# Patient Record
Sex: FEMALE | Race: WHITE | Employment: FULL TIME | URBAN - METROPOLITAN AREA
[De-identification: names, ages, dates, MRNs, and addresses within clinical notes are randomized per-mention and may not be internally consistent; named-entity substitution may affect disease eponyms.]

---

## 2017-06-14 ENCOUNTER — OP HISTORICAL/CONVERTED ENCOUNTER (OUTPATIENT)
Dept: OTHER | Age: 57
End: 2017-06-14

## 2017-08-15 ENCOUNTER — OFFICE VISIT (OUTPATIENT)
Dept: NEUROLOGY | Age: 57
End: 2017-08-15

## 2017-08-15 VITALS
RESPIRATION RATE: 20 BRPM | DIASTOLIC BLOOD PRESSURE: 70 MMHG | BODY MASS INDEX: 35.94 KG/M2 | WEIGHT: 229 LBS | SYSTOLIC BLOOD PRESSURE: 122 MMHG | HEIGHT: 67 IN

## 2017-08-15 DIAGNOSIS — G44.84 EXERTIONAL HEADACHE: Primary | ICD-10-CM

## 2017-08-15 RX ORDER — INDOMETHACIN 50 MG/1
CAPSULE ORAL
Qty: 60 CAP | Refills: 3 | Status: SHIPPED | OUTPATIENT
Start: 2017-08-15

## 2017-08-15 RX ORDER — GABAPENTIN 300 MG/1
300 CAPSULE ORAL
COMMUNITY

## 2017-08-15 RX ORDER — HYDROXYZINE 25 MG/1
25 TABLET, FILM COATED ORAL 3 TIMES DAILY
Refills: 3 | COMMUNITY
Start: 2017-07-20

## 2017-08-15 NOTE — PROGRESS NOTES
New patient presenting with headache since falling in [facility]. Patient reported Hematoma and black eye at that time.

## 2017-08-15 NOTE — PROGRESS NOTES
[address] [facility]. [address]   [place] [address] [facility] [address] [facility], [facility] [address]    [facility]   [phone] Fax             Referring: Emi Miller MD      Chief Complaint   Patient presents with    Headache     new patient   [address] [facility] [address]     55year-old right-handed woman who presents today for evaluation of what she calls headache ever since she tripped on the floor at [facility] September 19, 2015. She tells me on September 19, 2015 she was walking in the back room at [facility] and was saying good morning to a coworker. There was a palate in the floor with a piece of cardboard attached. She was looking at her coworker again greeting him and did not see the piece of cardboard and apparently tripped on that. She fell to the floor striking the right front of her head. She had a right frontal hematoma there. She was taken to the hospital and evaluated. She subsequently since that time has had continuous headaches ever since. She has had multiple evaluations. She has been seen by multiple different specialties. She notes that she has a headache in the bioccipital regions noting that has some minor facial pain as well. She notes that her symptoms increase when she lifts heavy things or when she exerts herself. She has not been exerting herself secondary to this. She has been on light duty at work. She says that the pain feels like someone has hit her with a brick. She says that if she carries groceries into the house she will get this. She does not do any other strenuous activity secondary to the pain coming on. As soon as she stops doing anything strenuous the pain stops. She has no weakness. No visual loss or disturbance. She has not had any loss of consciousness. She has no other focal symptoms. No chest pain or palpitations with this. No prodrome. No nausea no vomiting. No associated symptoms.   She has had MRI of the brain this been normal.  She is an MRI of the cervical spine. She has been seen by orthopedics. She has been seen by pain management. She has had occipital nerve blocks. She notes that the first occipital nerve blocks seem to help perhaps a little bit for 2-3 weeks but then her symptoms return. She had another injection in her neck and it really made no difference. [facility] and she was tried on [employer] as well as Ultram and Valium. She was also seen by Dr. Jim Mcmahon a neurologist and given Neurontin and Elavil and Naprosyn and that was no help either. She was seen by [facility] where she had a course of physical therapy also dry needling. That did not help. She was seen by pain management where she was tried with Zanaflex as well as nerve blocks. She was never given Trileptal.  Never given Indocin. She never had any leakage of fluid from her nose or eyes. Does not get tearing of the eyes with this. She does not have unilateral type symptoms. Does not have multiple types of these headaches throughout the day that are very short-lived with tearing. Does not seem to happen in the face primarily. Again more in the back of the head although she says it can happen in other places as well. She does not have sharp shooting pain from the occiput that radiates forward. She does not have a sore spot in the back of her head.     Past Medical History:   Diagnosis Date    Anxiety     Anxiety     Arthritis     Depression     GERD (gastroesophageal reflux disease)     Headache     Hypertension     IBS (irritable bowel syndrome)     Memory loss     Muscle pain     MVP (mitral valve prolapse)     Nausea & vomiting     Other ill-defined conditions     alessio valve prolapse    Overweight     RLS (restless legs syndrome)     Skipped beats     Thromboembolus (HCC)     Thrombophlebitis    Thyroid disease     Hypothyroid    TMJ (dislocation of temporomandibular joint)     TMJ syndrome     Unspecified sleep apnea     uses cpap    Urinary incontinence, stress        Past Surgical History:   Procedure Laterality Date    HX GI      Rectal Fissure Repair    HX GYN      hysterectomy & bladder sling    HX HEENT      TMJ Procedure    HX ORTHOPAEDIC      Left bunionectomy       Current Outpatient Prescriptions   Medication Sig Dispense Refill    gabapentin (NEURONTIN) 300 mg capsule Take 300 mg by mouth. 2 caps per day      hydrOXYzine HCl (ATARAX) 25 mg tablet 25 mg three (3) times daily. 3    cholecalciferol, vitamin d3, (VITAMIN D) 1,000 unit tablet Take 1,000 Units by mouth daily.  CALCIUM CARBONATE/VITAMIN D3 (CALCIUM 600 + D PO) Take 2 Tabs by mouth daily.  citalopram (CELEXA) 10 mg tablet Take 10 mg by mouth daily.  levothyroxine (SYNTHROID) 75 mcg tablet Take 75 mcg by mouth daily (before breakfast).  dicyclomine (BENTYL) 20 mg tablet Take 20 mg by mouth as needed.  lisinopril (PRINIVIL, ZESTRIL) 20 mg tablet Take 20 mg by mouth daily. Allergies   Allergen Reactions    Bactrim [Sulfamethoprim] Unknown (comments)    Sulfa (Sulfonamide Antibiotics) Rash       Social History   Substance Use Topics    Smoking status: Former Smoker     Packs/day: 0.25     Years: 10.00     Quit date: 1995    Smokeless tobacco: Never Used    Alcohol use No      Comment: One mixed drink 4 times a year       Family History   Problem Relation Age of Onset    Other Mother      MVP    Hypertension Mother     Emphysema Father     Coronary Artery Disease Father     Kidney Disease Father     Other Father      PAD    Hypertension Father     Heart Disease Father     Diabetes Sister     Other Sister      1 sister  w/ DM, 2nd sister  w/ lupus complications    Asthma Brother        Review of Systems  Pertinent positives and negatives are as noted above. Remainder of comprehensive systems review is negative.     Examination  Visit Vitals    /70    Resp 20    Ht 5' 7\" (1.702 m)    Wt 103.9 kg (229 lb)    BMI 35.87 kg/m2     Pleasant, well appearing. No icterus. Oropharynx clear. Supple neck without bruit. Heart regular. No murmur. No edema. She has no tenderness over the occipital notches bilaterally. No significant paracervical spasm. No spasm about the trapezius muscles. No point tenderness about the cranium or temporal arteries. Neurologically, she is awake, alert, and oriented with normal speech and language. Her cognition is normal. Intact cranial nerves 2-12. No nystagmus. Visual fields full to confrontation. Disk margins are flat bilaterally. She has normal bulk and tone. She has no abnormal movement. She has no pronation or drift. She generates full strength in the upper and lower extremities to direct confrontational testing. Reflexes are symmetrical in the upper and lower extremities bilaterally. Her toes are down bilaterally. No Schrader. Finger nose finger and rapid alternating movements are normal.  Steady gait. No sensory deficit to primary modalities. Medical records are reviewed and scanned into the media section. These include evaluations as noted above. She has had MRI scan I reviewed the reports and the brain demonstrated no significant abnormality. Her MRI of the cervical spine just some degenerative changes. Nothing major. Trials of medications and occipital nerve blocks were outlined as she noted above in her history. She actually did quite a good job and relating the history as again outlined in these records. Impression/Plan  Nice lady status post head trauma was noted above with an extensive evaluation including MRIs of the brain and cervical spine as noted above and with a normal examination. I agree that some of the headache that she complains does seem to be more consistent with an occipital neuralgia however she is failed multiple medication trials and she is also failed occipital nerve blocks.   The interesting part of the headache syndrome of what she describes today is really more of an exertional headache and given that the headache syndrome is of that nature were going to go ahead and treated as such. We discussed this today at length. We discussed the manner in which we treat exertional headache. To that end we will use Indocin 50 mg twice daily and we will take that with food. I confirm that she has never had any issue with GI bleeding. Discussed the potential for GI upset and GI bleed. Cautioned her again to use this with food. We will can use again a very low-dose. Answered all her questions today. I had like to see how this does with her headache. Follow-up in 2 months. This note was created using voice recognition software. Despite editing, there may be syntax errors. This note will not be viewable in [address]

## 2017-08-15 NOTE — PATIENT INSTRUCTIONS
Information Regarding Testing     If you have physican order for a test or a medication denied by your insurance company, this does not mean the test or medication is not appropriate for you as that is a medical decision, not a decision to be made by an insurance company representative or by an [facility] physician who has not interviewed and examined you. This is a decision to be made between you and your physician. The denial of services is a contractual matter between you and your insurance company, not an issue between your physician and the insurance company. If your test or medication is denied, you can take the following steps to help resolve the issue:    1. File a complaint with the [facility] regarding your insurance company's denial of services ordered for you. You can do this either by calling them directly or by completing an on-line complaint form on the fake company 2[employer]. This can be found at [email or web address]    2. Also file a formal complaint with your insurance company and ask to have the name of the person denying the service so that you may explore a legal option should you be harmed by this denial of service. Again, the fact the insurance company will not pay for the service does not mean it is not medically necessary and I would encourage you to follow through with the plan that was made with your physician    3. File a written complaint with your employer so your employer and benefit manager is aware of the poor coverage they are providing their employees. If you have medicare/medicaid, complain to your representative in the House and to your Jennyfer Quinones.     [facility]   Statement to Patients  April 1, 2014      In an effort to ensure the large volume of patient prescription refills is processed in the most efficient and expeditious manner, we are asking our patients to assist us by calling your Pharmacy for all prescription refills, this will include also your  Mail Order Pharmacy. The pharmacy will contact our office electronically to continue the refill process. Please do not wait until the last minute to call your pharmacy. We need at least 48 hours (2days) to fill prescriptions. We also encourage you to call your pharmacy before going to  your prescription to make sure it is ready. With regard to controlled substance prescription refill requests (narcotic refills) that need to be picked up at our office, we ask your cooperation by providing us with at least 72 hours (3days) notice that you will need a refill. We will not refill narcotic prescription refill requests after 4:00pm on any weekday, Monday through Thursday, or after 2:00pm on Fridays, or on the weekends. We encourage everyone to explore another way of getting your prescription refill request processed using [employer], our patient web portal through our electronic medical record system. [employer] is an efficient and effective way to communicate your medication request directly to the office and  downloadable as an vanna on your smart phone . [employer] also features a review functionality that allows you to view your medication list as well as leave messages for your physician. Are you ready to get connected? If so please review the attatched instructions or speak to any of our staff to get you set up right away! Thank you so much for your cooperation. Should you have any questions please contact our Practice Administrator. The Physicians and Staff,  [facility]     If we have ordered testing for you, we do not call patients with results and we do not give test results over the phone. We schedule follow up appointments so that your results can be discussed in person and any questions you have regarding them may be addressed.   If something of concern is revealed on your test, we will call you for a sooner follow up appointment. Additionally, results may be found by using the My Chart feature and one of our patient service representatives at the  can give you instructions on how to access this feature of our electronic medical record system. Learning About Living Bobbi Beverly  What is a living will? A living will is a legal form you use to write down the kind of care you want at the end of your life. It is used by the health professionals who will treat you if you aren't able to decide for yourself. If you put your wishes in writing, your loved ones and others will know what kind of care you want. They won't need to guess. This can ease your mind and be helpful to others. A living will is not the same as an estate or property will. An estate will explains what you want to happen with your money and property after you die. Is a living will a legal document? A living will is a legal document. Each state has its own laws about living [place]. If you move to another state, make sure that your living will is legal in the state where you now live. Or you might use a universal form that has been approved by many states. This kind of form can sometimes be completed and stored online. Your electronic copy will then be available wherever you have a connection to the Internet. In most cases, doctors will respect your wishes even if you have a form from a different state. · You don't need an  to complete a living will. But legal advice can be helpful if your state's laws are unclear, your health history is complicated, or your family can't agree on what should be in your living will. · You can change your living will at any time. Some people find that their wishes about end-of-life care change as their health changes. · In addition to making a living will, think about completing a medical power of  form.  This form lets you name the person you want to make end-of-life treatment decisions for you (your \"health care agent\") if you're not able to. Many hospitals and nursing homes will give you the forms you need to complete a living will and a medical power of . · Your living will is used only if you can't make or communicate decisions for yourself anymore. If you become able to make decisions again, you can accept or refuse any treatment, no matter what you wrote in your living will. · Your state may offer an online registry. This is a place where you can store your living will online so the doctors and nurses who need to treat you can find it right away. What should you think about when creating a living will? Talk about your end-of-life wishes with your family members and your doctor. Let them know what you want. That way the people making decisions for you won't be surprised by your choices. Think about these questions as you make your living will:  · Do you know enough about life support methods that might be used? If not, talk to your doctor so you know what might be done if you can't breathe on your own, your heart stops, or you're unable to swallow. · What things would you still want to be able to do after you receive life-support methods? Would you want to be able to walk? To speak? To eat on your own? To live without the help of machines? · If you have a choice, where do you want to be cared for? In your home? At a hospital or nursing home? · Do you want certain Adventism practices performed if you become very ill? · If you have a choice at the end of your life, where would you prefer to die? At home? In a hospital or nursing home? Somewhere else? · Would you prefer to be buried or cremated? · Do you want your organs to be donated after you die? What should you do with your living will? · Make sure that your family members and your health care agent have copies of your living will. · Give your doctor a copy of your living will to keep in your medical record.  If you have more than one doctor, make sure that each one has a copy. · You may want to put a copy of your living will where it can be easily found. Where can you learn more? Go to http://ivelisse-jaymie.info/. Enter P000 in the search box to learn more about \"Learning About Living Anastasiya Parks. \"  Current as of: August 8, 2016  Content Version: 11.3  © 7251-2831 [employer]. Care instructions adapted under license by [employer] (which disclaims liability or warranty for this information). If you have questions about a medical condition or this instruction, always ask your healthcare professional. [address] any warranty or liability for your use of this information. Advance Directives: Care Instructions  Your Care Instructions  An advance directive is a legal way to state your wishes at the end of your life. It tells your family and your doctor what to do if you can no longer say what you want. There are two main types of advance directives. You can change them any time that your wishes change. · A living will tells your family and your doctor your wishes about life support and other treatment. · A durable power of  for health care lets you name a person to make treatment decisions for you when you can't speak for yourself. This person is called a health care agent. If you do not have an advance directive, decisions about your medical care may be made by a doctor or a  who doesn't know you. It may help to think of an advance directive as a gift to the people who care for you. If you have one, they won't have to make tough decisions by themselves. Follow-up care is a key part of your treatment and safety. Be sure to make and go to all appointments, and call your doctor if you are having problems. It's also a good idea to know your test results and keep a list of the medicines you take. How can you care for yourself at home?   · Discuss your wishes with your loved ones and your doctor. This way, there are no surprises. · Many states have a unique form. Or you might use a universal form that has been approved by many states. This kind of form can sometimes be completed and stored online. Your electronic copy will then be available wherever you have a connection to the Internet. In most cases, doctors will respect your wishes even if you have a form from a different state. · You don't need a  to do an advance directive. But you may want to get legal advice. · Think about these questions when you prepare an advance directive:  ¨ Who do you want to make decisions about your medical care if you are not able to? Many people choose a family member or close friend. ¨ Do you know enough about life support methods that might be used? If not, talk to your doctor so you understand. ¨ What are you most afraid of that might happen? You might be afraid of having pain, losing your independence, or being kept alive by machines. ¨ Where would you prefer to die? Choices include your home, a hospital, or a nursing home. ¨ Would you like to have information about hospice care to support you and your family? ¨ Do you want to donate organs when you die? ¨ Do you want certain Oriental orthodox practices performed before you die? If so, put your wishes in the advance directive. · Read your advance directive every year, and make changes as needed. When should you call for help? Be sure to contact your doctor if you have any questions. Where can you learn more? Go to http://ivelisse-jaymie.info/. Enter R264 in the search box to learn more about \"Advance Directives: Care Instructions. \"  Current as of: November 17, 2016  Content Version: 11.3  © 0679-1946 [employer]. Care instructions adapted under license by [employer] (which disclaims liability or warranty for this information).  If you have questions about PRESCRIPTION REFILL [address] [facility]   Statement to Patients  April 1, 2014      In an effort to ensure the large volume of patient prescription refills is processed in the most efficient and expeditious manner, we are asking our patients to assist us by calling your Pharmacy for all prescription refills, this will include also your  Mail Order Pharmacy. The pharmacy will contact our office electronically to continue the refill process. Please do not wait until the last minute to call your pharmacy. We need at least 48 hours (2days) to fill prescriptions. We also encourage you to call your pharmacy before going to  your prescription to make sure it is ready. With regard to controlled substance prescription refill requests (narcotic refills) that need to be picked up at our office, we ask your cooperation by providing us with at least 72 hours (3days) notice that you will need a refill. We will not refill narcotic prescription refill requests after 4:00pm on any weekday, Monday through Thursday, or after 2:00pm on Fridays, or on the weekends. We encourage everyone to explore another way of getting your prescription refill request processed using [employer], our patient web portal through our electronic medical record system. [employer] is an efficient and effective way to communicate your medication request directly to the office and  downloadable as an vanna on your smart phone . [employer] also features a review functionality that allows you to view your medication list as well as leave messages for your physician. Are you ready to get connected? If so please review the attatched instructions or speak to any of our staff to get you set up right away! Thank you so much for your cooperation. Should you have any questions please contact our Practice Administrator.     The Physicians and Staff,  [address] [place] [address] [facility]   a medical condition or this instruction, always ask your healthcare professional. [address] any warranty or liability for your use of this information.

## 2017-08-15 NOTE — MR AVS SNAPSHOT
Visit Information Date & Time Provider Department Dept. Phone Encounter #  
 8/15/2017  4:00 PM Arslan Polo, José [address] [facility] [phone] [id number] Follow-up Instructions Return in about 8 weeks (around 10/10/2017). Upcoming Health Maintenance Date Due Hepatitis C Screening 1960 DTaP/Tdap/Td series (1 - Tdap) 10/7/1982 PAP AKA CERVICAL CYTOLOGY 10/7/1982 BREAST CANCER SCRN MAMMOGRAM 10/7/2011 FOBT Q 1 YEAR AGE 50-75 10/7/2011 INFLUENZA AGE 9 TO ADULT 8/1/2017 Allergies as of 8/15/2017  Review Complete On: 8/15/2017 By: Arslan Polo MD  
  
 Severity Noted Reaction Type Reactions Bactrim [Sulfamethoprim]  08/15/2017    Unknown (comments) Sulfa (Sulfonamide Antibiotics)  08/19/2010    Rash Current Immunizations  Never Reviewed No immunizations on file. Not reviewed this visit Vitals BP Resp Height(growth percentile) Weight(growth percentile) BMI OB Status 122/70 20 5' 7\" (1.702 m) 229 lb (103.9 kg) 35.87 kg/m2 Hysterectomy Smoking Status Former Smoker Vitals History BMI and BSA Data Body Mass Index Body Surface Area  
 35.87 kg/m 2 2.22 m 2 Preferred Pharmacy Pharmacy Name Phone [id number] [place], [place] - [address] [facility] [phone] Your Updated Medication List  
  
   
This list is accurate as of: 8/15/17  5:13 PM.  Always use your most recent med list.  
  
  
  
  
 CALCIUM 600 + D PO Take 2 Tabs by mouth daily. citalopram 10 mg tablet Commonly known as:  Armin Covert Take 10 mg by mouth daily. dicyclomine 20 mg tablet Commonly known as:  BENTYL Take 20 mg by mouth as needed. gabapentin 300 mg capsule Commonly known as:  NEURONTIN Take 300 mg by mouth. 2 caps per day  
  
 hydrOXYzine HCl 25 mg tablet Commonly known as:  ATARAX 25 mg three (3) times daily. indomethacin 50 mg capsule Commonly known as:  INDOCIN  
1 po bid with food  
  
 levothyroxine 75 mcg tablet Commonly known as:  SYNTHROID Take 75 mcg by mouth daily (before breakfast). lisinopril 20 mg tablet Commonly known as:  Lollie Jump Take 20 mg by mouth daily. VITAMIN D3 1,000 unit tablet Generic drug:  cholecalciferol Take 1,000 Units by mouth daily. Prescriptions Sent to Pharmacy Refills  
 indomethacin (INDOCIN) 50 mg capsule 3 Si po bid with food Class: Normal  
 Pharmacy: [id number]- [address] #: [phone] Follow-up Instructions Return in about 8 weeks (around 10/10/2017). Patient Instructions Information Regarding Testing If you have physican order for a test or a medication denied by your insurance company, this does not mean the test or medication is not appropriate for you as that is a medical decision, not a decision to be made by an insurance company representative or by an [facility] physician who has not interviewed and examined you. This is a decision to be made between you and your physician. The denial of services is a contractual matter between you and your insurance company, not an issue between your physician and the insurance company. If your test or medication is denied, you can take the following steps to help resolve the issue: 1. File a complaint with the [facility] of Insurance regarding your insurance company's denial of services ordered for you. You can do this either by calling them directly or by completing an on-line complaint form on the [place]. This can be found at www.[place].gov 2.    Also file a formal complaint with your insurance company and ask to have the name of the person denying the service so that you may explore a legal option should you be harmed by this denial of service. Again, the fact the insurance company will not pay for the service does not mean it is not medically necessary and I would encourage you to follow through with the plan that was made with your physician 3. File a written complaint with your employer so your employer and benefit manager is aware of the poor coverage they are providing their employees. If you have medicare/medicaid, complain to your representative in the House and to your Jennyfer Quinones. PRESCRIPTION REFILL POLICY [facility] Statement to Patients April 1, 2014 In an effort to ensure the large volume of patient prescription refills is processed in the most efficient and expeditious manner, we are asking our patients to assist us by calling your Pharmacy for all prescription refills, this will include also your  Mail Order Pharmacy. The pharmacy will contact our office electronically to continue the refill process. Please do not wait until the last minute to call your pharmacy. We need at least 48 hours (2days) to fill prescriptions. We also encourage you to call your pharmacy before going to  your prescription to make sure it is ready. With regard to controlled substance prescription refill requests (narcotic refills) that need to be picked up at our office, we ask your cooperation by providing us with at least 72 hours (3days) notice that you will need a refill. We will not refill narcotic prescription refill requests after 4:00pm on any weekday, Monday through Thursday, or after 2:00pm on Fridays, or on the weekends. We encourage everyone to explore another way of getting your prescription refill request processed using [employer], our patient web portal through our electronic medical record system.  [employer] is an efficient and effective way to communicate your medication request directly to the office and  downloadable as an vanna on your smart phone . [employer] also features a review functionality that allows you to view your medication list as well as leave messages for your physician. Are you ready to get connected? If so please review the attatched instructions or speak to any of our staff to get you set up right away! Thank you so much for your cooperation. Should you have any questions please contact our Practice Administrator. The Physicians and Staff,  [facility] If we have ordered testing for you, we do not call patients with results and we do not give test results over the phone. We schedule follow up appointments so that your results can be discussed in person and any questions you have regarding them may be addressed. If something of concern is revealed on your test, we will call you for a sooner follow up appointment. Additionally, results may be found by using the My Chart feature and one of our patient service representatives at the  can give you instructions on how to access this feature of our electronic medical record system. Claire Baez 1721 What is a living will? A living will is a legal form you use to write down the kind of care you want at the end of your life. It is used by the health professionals who will treat you if you aren't able to decide for yourself. If you put your wishes in writing, your loved ones and others will know what kind of care you want. They won't need to guess. This can ease your mind and be helpful to others. A living will is not the same as an estate or property will. An estate will explains what you want to happen with your money and property after you die. Is a living will a legal document? A living will is a legal document. Each state has its own laws about living [place].  If you move to another state, make sure that your living will is legal in the state where you now live. Or you might use a universal form that has been approved by many states. This kind of form can sometimes be completed and stored online. Your electronic copy will then be available wherever you have a connection to the Internet. In most cases, doctors will respect your wishes even if you have a form from a different state. · You don't need an  to complete a living will. But legal advice can be helpful if your state's laws are unclear, your health history is complicated, or your family can't agree on what should be in your living will. · You can change your living will at any time. Some people find that their wishes about end-of-life care change as their health changes. · In addition to making a living will, think about completing a medical power of  form. This form lets you name the person you want to make end-of-life treatment decisions for you (your \"health care agent\") if you're not able to. Many hospitals and nursing homes will give you the forms you need to complete a living will and a medical power of . · Your living will is used only if you can't make or communicate decisions for yourself anymore. If you become able to make decisions again, you can accept or refuse any treatment, no matter what you wrote in your living will. · Your state may offer an online registry. This is a place where you can store your living will online so the doctors and nurses who need to treat you can find it right away. What should you think about when creating a living will? Talk about your end-of-life wishes with your family members and your doctor. Let them know what you want. That way the people making decisions for you won't be surprised by your choices. Think about these questions as you make your living will: · Do you know enough about life support methods that might be used?  If not, talk to your doctor so you know what might be done if you can't breathe on your own, your heart stops, or you're unable to swallow. · What things would you still want to be able to do after you receive life-support methods? Would you want to be able to walk? To speak? To eat on your own? To live without the help of machines? · If you have a choice, where do you want to be cared for? In your home? At a hospital or nursing home? · Do you want certain Anabaptist practices performed if you become very ill? · If you have a choice at the end of your life, where would you prefer to die? At home? In a hospital or nursing home? Somewhere else? · Would you prefer to be buried or cremated? · Do you want your organs to be donated after you die? What should you do with your living will? · Make sure that your family members and your health care agent have copies of your living will. · Give your doctor a copy of your living will to keep in your medical record. If you have more than one doctor, make sure that each one has a copy. · You may want to put a copy of your living will where it can be easily found. Where can you learn more? Go to http://ivelisse-jaymie.info/. Enter U333 in the search box to learn more about \"Learning About Living Perroy. \" Current as of: August 8, 2016 Content Version: 11.3 © 5117-7879 Touch [employer]ion[employer]. Care instructions adapted under license by [employer] (which disclaims liability or warranty for this information). If you have questions about a medical condition or this instruction, always ask your healthcare professional. [place] [address] any warranty or liability for your use of this information. Advance Directives: Care Instructions Your Care Instructions An advance directive is a legal way to state your wishes at the end of your life. It tells your family and your doctor what to do if you can no longer say what you want. There are two main types of advance directives. You can change them any time that your wishes change. · A living will tells your family and your doctor your wishes about life support and other treatment. · A durable power of  for health care lets you name a person to make treatment decisions for you when you can't speak for yourself. This person is called a health care agent. If you do not have an advance directive, decisions about your medical care may be made by a doctor or a  who doesn't know you. It may help to think of an advance directive as a gift to the people who care for you. If you have one, they won't have to make tough decisions by themselves. Follow-up care is a key part of your treatment and safety. Be sure to make and go to all appointments, and call your doctor if you are having problems. It's also a good idea to know your test results and keep a list of the medicines you take. How can you care for yourself at home? · Discuss your wishes with your loved ones and your doctor. This way, there are no surprises. · Many states have a unique form. Or you might use a universal form that has been approved by many states. This kind of form can sometimes be completed and stored online. Your electronic copy will then be available wherever you have a connection to the Internet. In most cases, doctors will respect your wishes even if you have a form from a different state. · You don't need a  to do an advance directive. But you may want to get legal advice. · Think about these questions when you prepare an advance directive: ¨ Who do you want to make decisions about your medical care if you are not able to? Many people choose a family member or close friend. ¨ Do you know enough about life support methods that might be used? If not, talk to your doctor so you understand. ¨ What are you most afraid of that might happen?  You might be afraid of having pain, losing your independence, or being kept alive by machines. ¨ Where would you prefer to die? Choices include your home, a hospital, or a nursing home. ¨ Would you like to have information about hospice care to support you and your family? ¨ Do you want to donate organs when you die? ¨ Do you want certain Christianity practices performed before you die? If so, put your wishes in the advance directive. · Read your advance directive every year, and make changes as needed. When should you call for help? Be sure to contact your doctor if you have any questions. Where can you learn more? Go to http://ivelisseProductivjaymie.info/. Enter R264 in the search box to learn more about \"Advance Directives: Care Instructions. \" Current as of: November 17, 2016 Content Version: 11.3 © 7510-7705 [employer]. Care instructions adapted under license by [employer] (which disclaims liability or warranty for this information). If you have questions about PRESCRIPTION REFILL POLICY [facility] Statement to Patients April 1, 2014 In an effort to ensure the large volume of patient prescription refills is processed in the most efficient and expeditious manner, we are asking our patients to assist us by calling your Pharmacy for all prescription refills, this will include also your  Mail Order Pharmacy. The pharmacy will contact our office electronically to continue the refill process. Please do not wait until the last minute to call your pharmacy. We need at least 48 hours (2days) to fill prescriptions. We also encourage you to call your pharmacy before going to  your prescription to make sure it is ready. With regard to controlled substance prescription refill requests (narcotic refills) that need to be picked up at our office, we ask your cooperation by providing us with at least 72 hours (3days) notice that you will need a refill. We will not refill narcotic prescription refill requests after 4:00pm on any weekday, Monday through Thursday, or after 2:00pm on Fridays, or on the weekends. We encourage everyone to explore another way of getting your prescription refill request processed using [employer], our patient web portal through our electronic medical record system. [employer] is an efficient and effective way to communicate your medication request directly to the office and  downloadable as an vanna on your smart phone . [employer] also features a review functionality that allows you to view your medication list as well as leave messages for your physician. Are you ready to get connected? If so please review the attatched instructions or speak to any of our staff to get you set up right away! Thank you so much for your cooperation. Should you have any questions please contact our Practice Administrator. The Physicians and Staff,  [facility]  
a medical condition or this instruction, always ask your healthcare professional. [place] [address] any warranty or liability for your use of this information. Introducing [facility]! [facility] introduces [employer] patient portal. Now you can access parts of your medical record, email your doctor's office, and request medication refills online. 1. In your internet browser, go to [email or web address] 2. Click on the First Time User? Click Here link in the Sign In box. You will see the New Member Sign Up page. 3. Enter your [employer] Access Code exactly as it appears below. You will not need to use this code after youve completed the sign-up process. If you do not sign up before the expiration date, you must request a new code. · [employer] Access Code: [email or web address] Expires: 10/23/2017  9:50 AM 
 
4.  Enter the last four digits of your Social Security Number (xxxx) and Date of Birth (mm/dd/yyyy) as indicated and click Submit. You will be taken to the next sign-up page. 5. Create a 2U ID. This will be your 2U login ID and cannot be changed, so think of one that is secure and easy to remember. 6. Create a 2U password. You can change your password at any time. 7. Enter your Password Reset Question and Answer. This can be used at a later time if you forget your password. 8. Enter your e-mail address. You will receive e-mail notification when new information is available in [address] 9. Click Sign Up. You can now view and download portions of your medical record. 10. Click the Download Summary menu link to download a portable copy of your medical information. If you have questions, please visit the Frequently Asked Questions section of the 2U website. Remember, 2U is NOT to be used for urgent needs. For medical emergencies, dial 911. Now available from your iPhone and Android! Please provide this summary of care documentation to your next provider. Your primary care clinician is listed as Rohan Minaya. If you have any questions after today's visit, please call [phone].

## 2017-10-06 ENCOUNTER — HOSPITAL ENCOUNTER (OUTPATIENT)
Dept: MRI IMAGING | Age: 57
Discharge: HOME | End: 2017-10-06
Attending: PHYSICIAN ASSISTANT
Payer: COMMERCIAL

## 2017-10-06 DIAGNOSIS — R51.9 OCCIPITAL PAIN: ICD-10-CM

## 2017-10-06 DIAGNOSIS — Z98.1 HISTORY OF FUSION OF CERVICAL SPINE: ICD-10-CM

## 2017-10-06 PROCEDURE — 72141 MRI NECK SPINE W/O DYE: CPT

## 2017-10-17 ENCOUNTER — OFFICE VISIT (OUTPATIENT)
Dept: NEUROLOGY | Age: 57
End: 2017-10-17

## 2017-10-17 VITALS
HEART RATE: 66 BPM | BODY MASS INDEX: 35.79 KG/M2 | RESPIRATION RATE: 17 BRPM | WEIGHT: 228 LBS | DIASTOLIC BLOOD PRESSURE: 82 MMHG | OXYGEN SATURATION: 97 % | TEMPERATURE: 98 F | HEIGHT: 67 IN | SYSTOLIC BLOOD PRESSURE: 118 MMHG

## 2017-10-17 DIAGNOSIS — G44.89 OTHER HEADACHE SYNDROME: Primary | ICD-10-CM

## 2017-10-17 RX ORDER — ESCITALOPRAM OXALATE 20 MG/1
20 TABLET ORAL DAILY
COMMUNITY

## 2017-10-17 NOTE — PROGRESS NOTES
Follow up for headaches. No significant changes in headaches since last visit. No acute problems reported.

## 2017-10-17 NOTE — PROGRESS NOTES
Date:  10/18/17     Name:  Franck Veliz  :  1960  MRN:  [id number]     PCP:  Germaine Hui MD    Chief Complaint   Patient presents with    Headache     follow up        HISTORY OF PRESENT ILLNESS:Follow up for head pain. Last visit she was placed on Indocin to see if she would get malissa head relief. She has seen orthopedics for second opinion and had a recent MRI scan which was performed in a month or facility and I reviewed the film today and there is nothing significant about that film today. She reports medication did not work. She her head pain is located on the back of her head. She describes it as a tightness. She report that it feels better on with  Ice. She report that the headache last  anywhere from a couple mIn to a couple of hours. Brenda Spray goes into the next day. She states after she rest it ease off. If she get up and do the same activity again like lifting it come right back. Denies any teary eye  She reports when the pain is real bad she will feel pain in her faces more like an ache. She denies any vision loss. Things that aggravate it is. Drive,head low, head up too high and reaching, lifting things. Turning head left and right when she is drive of she has to repeat it, Picking weeds,carrying thing while walking. She  states that it does not happen as often at home, because she is not doing as much work. However when she does do some of these activities she notices the pain is still present. Except as noted above, denies  fever, chills, cough. No CP or SOB. No dysuria, loss of bowel or bladder control. No Weight loss. Appetite good. Sleeping well. No sweats. No edema. No bruising or bleeding. No nausea or vomit. No diarrhea. No frequency, urgency, No depressive sxs. No anxiety. Denies sore throat, nasal congestion, nasal discharge, epistaxis, tinnitus, hearing loss, back pain, muscle pain, or joint pain.        Current Outpatient Prescriptions   Medication Sig    escitalopram oxalate (LEXAPRO) 20 mg tablet Take 20 mg by mouth daily.  gabapentin (NEURONTIN) 300 mg capsule Take 300 mg by mouth. 2 caps per day    indomethacin (INDOCIN) 50 mg capsule 1 po bid with food    cholecalciferol, vitamin d3, (VITAMIN D) 1,000 unit tablet Take 1,000 Units by mouth daily.  CALCIUM CARBONATE/VITAMIN D3 (CALCIUM 600 + D PO) Take 2 Tabs by mouth daily.  levothyroxine (SYNTHROID) 75 mcg tablet Take 75 mcg by mouth daily (before breakfast).  dicyclomine (BENTYL) 20 mg tablet Take 20 mg by mouth as needed.  lisinopril (PRINIVIL, ZESTRIL) 20 mg tablet Take 20 mg by mouth daily.  hydrOXYzine HCl (ATARAX) 25 mg tablet 25 mg three (3) times daily.  citalopram (CELEXA) 10 mg tablet Take 10 mg by mouth daily. No current facility-administered medications for this visit.       Allergies   Allergen Reactions    Bactrim [Sulfamethoprim] Unknown (comments)    Sulfa (Sulfonamide Antibiotics) Rash     Past Medical History:   Diagnosis Date    Anxiety     Anxiety     Arthritis     Depression     GERD (gastroesophageal reflux disease)     Headache     Hypertension     IBS (irritable bowel syndrome)     Memory loss     Muscle pain     MVP (mitral valve prolapse)     Nausea & vomiting     Other ill-defined conditions(799.89)     alessio valve prolapse    Overweight(278.02)     RLS (restless legs syndrome)     Skipped beats     Thromboembolus (HCC)     Thrombophlebitis    Thyroid disease     Hypothyroid    TMJ (dislocation of temporomandibular joint)     TMJ syndrome     Unspecified sleep apnea     uses cpap    Urinary incontinence, stress      Past Surgical History:   Procedure Laterality Date    HX GI      Rectal Fissure Repair    HX GYN      hysterectomy & bladder sling    HX HEENT      TMJ Procedure    HX ORTHOPAEDIC      Left bunionectomy     Social History     Social History    Marital status:      Spouse name: N/A    Number of children: N/A    Years of education: N/A     Occupational History    Not on file. Social History Main Topics    Smoking status: Former Smoker     Packs/day: 0.25     Years: 10.00     Quit date: 1995    Smokeless tobacco: Never Used    Alcohol use No      Comment: One mixed drink 4 times a year    Drug use: Yes     Special: Prescription, OTC    Sexual activity: No     Other Topics Concern    Not on file     Social History Narrative     Family History   Problem Relation Age of Onset    Other Mother      MVP    Hypertension Mother     Emphysema Father     Coronary Artery Disease Father     Kidney Disease Father     Other Father      PAD    Hypertension Father     Heart Disease Father     Diabetes Sister     Other Sister      1 sister  w/ DM, 2nd sister  w/ lupus complications    Asthma Brother        PHYSICAL EXAMINATION:    Visit Vitals    /82    Pulse 66    Temp 98 °F (36.7 °C)    Resp 17    Ht 5' 7\" (1.702 m)    Wt 103.4 kg (228 lb)    SpO2 97%    BMI 35.71 kg/m2   General: Well defined, nourished, and groomed individual in no acute distress. Neck: Supple, nontender, no bruits, no pain with resistance to active range of motion. Heart: Regular rate and rhythm, no murmurs, rub, or gallop. Normal S1S2. Lungs: Clear to auscultation bilaterally with equal chest expansion, no cough, no wheeze  Musculoskeletal: Extremities revealed no edema and had full range of motion of joints. Psych: Good mood and bright affect      NEUROLOGICAL EXAMINATION:   Mental Status: Alert and oriented to person, place, and time       Cranial Nerves:   II, III, IV, VI: Visual acuity grossly intact. Visual fields are normal.   Pupils are equal, round, and reactive to light and accommodation. Extra-ocular movements are full and fluid. Fundoscopic exam was benign, no ptosis or nystagmus. V-XII: Hearing is grossly intact. Facial features are symmetric, with normal sensation and strength.  The palate rises symmetrically and the tongue protrudes midline. Sternocleidomastoids 5/5.       Motor Examination: Normal tone, bulk, and strength, 5/5 muscle strength throughout.       Coordination: No resting or intention tremor      Gait and Station: Steady while walking. Normal arm swing. No pronator drift. No muscle wasting or fasiculations noted.       Reflexes: DTRs 2+ throughout. ASSESSMENT AND PLAN  Ongoing complaint of headache, variable as described. Given the fact she has not had any relief we discussed the fact that this just may be something she is going to have to live with. We will stop the Indocin. Follow-up as needed. Norberto Malin FNANNA-YONATAN    I have reviewed the documentation provided by the nurse practitioner, Ms. Tolu Rocha, and we have discussed her findings and the clinical impression. I have formulated with her the proposed management plans for this patient. Additionally,  I have personally evaluated the patient to verify the history and to confirm physical findings. Below are my additional comments:  Patient returns today for follow-up for complaints of headache status post an injury she sustained at work. Recall from my previous note she has tried multiple medications and failed. She has tried physical therapy as well as dry needling. She has seen orthopedics for second opinion and had a recent MRI scan which was performed in a month or facility and I reviewed the film today and there is nothing significant about that film today. She notes that she is not having a headache when she is at home. The Indocin she says did not do anything for the headache.   It is interesting in that she says that she only gets a headache when she has to do something but really on review again of the way she describes a headache she is describing a pressure sensation whenever she has to do any kind of activity and is really not consistent with exertional headache which is what I was going on previously trying to classify this headache into a syndrome. It seems as though as long as she is not having to go to work she is not having a headache. It also seems as though as long as she is not having to do any kind of activity she is not having any kind of pressure sensation. She is not having any sharp shooting pain. Not having any focal deficits. Not having any autonomic type symptoms. She did tolerate the Indocin but again says that it did not think. We discussed her case today. Her examination remains nonfocal.  Given the fact she has not had any relief we discussed the fact that this just may be something she is going to have to live with. We will stop the Indocin. Follow-up as needed.     Total time: 25 min   Counseling / coordination time: 15 min   > 50% counseling / coordination?: Yes re: as documented above, questions, etc    Carolina Posadas MD

## 2017-12-14 ENCOUNTER — OP HISTORICAL/CONVERTED ENCOUNTER (OUTPATIENT)
Dept: OTHER | Age: 57
End: 2017-12-14

## 2018-01-24 ENCOUNTER — OP HISTORICAL/CONVERTED ENCOUNTER (OUTPATIENT)
Dept: OTHER | Age: 58
End: 2018-01-24

## 2018-06-25 ENCOUNTER — OP HISTORICAL/CONVERTED ENCOUNTER (OUTPATIENT)
Dept: OTHER | Age: 58
End: 2018-06-25

## 2018-08-13 ENCOUNTER — OFFICE VISIT (OUTPATIENT)
Dept: NEUROLOGY | Age: 58
End: 2018-08-13

## 2018-08-13 DIAGNOSIS — G44.84 EXERTIONAL HEADACHE: ICD-10-CM

## 2018-08-13 DIAGNOSIS — R41.3 SHORT-TERM MEMORY LOSS: ICD-10-CM

## 2018-08-13 DIAGNOSIS — F32.A ANXIETY AND DEPRESSION: ICD-10-CM

## 2018-08-13 DIAGNOSIS — R47.89 WORD FINDING DIFFICULTY: ICD-10-CM

## 2018-08-13 DIAGNOSIS — R41.89 DIFFICULTY PROCESSING INFORMATION: ICD-10-CM

## 2018-08-13 DIAGNOSIS — Z87.820 HISTORY OF CONCUSSION: ICD-10-CM

## 2018-08-13 DIAGNOSIS — F41.9 ANXIETY AND DEPRESSION: ICD-10-CM

## 2018-08-13 DIAGNOSIS — R41.9 DEFICIT IN COMPREHENSION: ICD-10-CM

## 2018-08-13 DIAGNOSIS — F07.81 POST CONCUSSION SYNDROME: Primary | ICD-10-CM
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
## 2018-10-01 ENCOUNTER — OFFICE VISIT (OUTPATIENT)
Dept: NEUROLOGY | Age: 58
End: 2018-10-01

## 2018-10-01 DIAGNOSIS — Z87.820 HISTORY OF CONCUSSION: ICD-10-CM

## 2018-10-01 DIAGNOSIS — R47.89 WORD FINDING DIFFICULTY: ICD-10-CM

## 2018-10-01 DIAGNOSIS — G44.89 OTHER HEADACHE SYNDROME: ICD-10-CM

## 2018-10-01 DIAGNOSIS — F07.81 POST CONCUSSION SYNDROME: Primary | ICD-10-CM

## 2018-10-01 DIAGNOSIS — F41.9 MILD ANXIETY: ICD-10-CM

## 2018-10-01 DIAGNOSIS — F32.1 MODERATE MAJOR DEPRESSION (HCC): ICD-10-CM

## 2018-10-01 DIAGNOSIS — F90.0 ATTENTION DEFICIT HYPERACTIVITY DISORDER (ADHD), INATTENTIVE TYPE, MODERATE: Chronic | ICD-10-CM

## 2018-10-01 DIAGNOSIS — R45.851 PASSIVE SUICIDAL IDEATIONS: ICD-10-CM
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
## 2020-04-20 ENCOUNTER — ED HISTORICAL/CONVERTED ENCOUNTER (OUTPATIENT)
Dept: OTHER | Age: 60
End: 2020-04-20

## 2020-07-13 ENCOUNTER — OP HISTORICAL/CONVERTED ENCOUNTER (OUTPATIENT)
Dept: OTHER | Age: 60
End: 2020-07-13

## 2020-08-18 ENCOUNTER — ED HISTORICAL/CONVERTED ENCOUNTER (OUTPATIENT)
Dept: OTHER | Age: 60
End: 2020-08-18

## 2021-04-06 ENCOUNTER — APPOINTMENT (OUTPATIENT)
Dept: CT IMAGING | Age: 61
End: 2021-04-06
Attending: FAMILY MEDICINE
Payer: COMMERCIAL

## 2021-04-06 ENCOUNTER — HOSPITAL ENCOUNTER (EMERGENCY)
Age: 61
Discharge: HOME | End: 2021-04-06
Attending: FAMILY MEDICINE
Payer: COMMERCIAL

## 2021-04-06 ENCOUNTER — APPOINTMENT (OUTPATIENT)
Dept: GENERAL RADIOLOGY | Age: 61
End: 2021-04-06
Attending: FAMILY MEDICINE
Payer: COMMERCIAL

## 2021-04-06 VITALS
WEIGHT: 225 LBS | TEMPERATURE: 98.4 F | SYSTOLIC BLOOD PRESSURE: 151 MMHG | HEART RATE: 81 BPM | BODY MASS INDEX: 35.31 KG/M2 | RESPIRATION RATE: 20 BRPM | DIASTOLIC BLOOD PRESSURE: 79 MMHG | OXYGEN SATURATION: 99 % | HEIGHT: 67 IN

## 2021-04-06 DIAGNOSIS — K44.9 HIATAL HERNIA: ICD-10-CM

## 2021-04-06 DIAGNOSIS — E87.6 HYPOKALEMIA: Primary | ICD-10-CM

## 2021-04-06 LAB
ALBUMIN SERPL-MCNC: 4.1 G/DL (ref 3.5–5)
ALBUMIN/GLOB SERPL: 1.3 {RATIO} (ref 1.1–2.2)
ALP SERPL-CCNC: 104 U/L (ref 45–117)
ALT SERPL-CCNC: 33 U/L (ref 12–78)
ANION GAP SERPL CALC-SCNC: 17 MMOL/L (ref 5–15)
AST SERPL W P-5'-P-CCNC: 27 U/L (ref 15–37)
BASOPHILS # BLD: 0 K/UL (ref 0–0.1)
BASOPHILS NFR BLD: 0 % (ref 0–1)
BILIRUB SERPL-MCNC: 1 MG/DL (ref 0.2–1)
BUN SERPL-MCNC: 18 MG/DL (ref 6–20)
BUN/CREAT SERPL: 18 (ref 12–20)
CA-I BLD-MCNC: 9.3 MG/DL (ref 8.5–10.1)
CHLORIDE SERPL-SCNC: 99 MMOL/L (ref 97–108)
CO2 SERPL-SCNC: 21 MMOL/L (ref 21–32)
CREAT SERPL-MCNC: 1 MG/DL (ref 0.55–1.02)
DIFFERENTIAL METHOD BLD: ABNORMAL
EOSINOPHIL # BLD: 0.1 K/UL (ref 0–0.4)
EOSINOPHIL NFR BLD: 1 % (ref 0–7)
ERYTHROCYTE [DISTWIDTH] IN BLOOD BY AUTOMATED COUNT: 12.9 % (ref 11.5–14.5)
GLOBULIN SER CALC-MCNC: 3.2 G/DL (ref 2–4)
GLUCOSE BLD STRIP.AUTO-MCNC: 142 MG/DL (ref 65–100)
GLUCOSE SERPL-MCNC: 138 MG/DL (ref 65–100)
HCT VFR BLD AUTO: 34.5 % (ref 35–47)
HGB BLD-MCNC: 12.3 G/DL (ref 11.5–16)
IMM GRANULOCYTES # BLD AUTO: 0 K/UL (ref 0–0.04)
IMM GRANULOCYTES NFR BLD AUTO: 0 % (ref 0–0.5)
LYMPHOCYTES # BLD: 1.6 K/UL (ref 0.8–3.5)
LYMPHOCYTES NFR BLD: 17 % (ref 12–49)
MCH RBC QN AUTO: 32.3 PG (ref 26–34)
MCHC RBC AUTO-ENTMCNC: 35.7 G/DL (ref 30–36.5)
MCV RBC AUTO: 90.6 FL (ref 80–99)
MONOCYTES # BLD: 0.5 K/UL (ref 0–1)
MONOCYTES NFR BLD: 5 % (ref 5–13)
NEUTS SEG # BLD: 7.5 K/UL (ref 1.8–8)
NEUTS SEG NFR BLD: 77 % (ref 32–75)
PERFORMED BY, TECHID: ABNORMAL
PLATELET # BLD AUTO: 370 K/UL (ref 150–400)
PMV BLD AUTO: 10.4 FL (ref 8.9–12.9)
POTASSIUM SERPL-SCNC: 2.5 MMOL/L (ref 3.5–5.1)
PROT SERPL-MCNC: 7.3 G/DL (ref 6.4–8.2)
RBC # BLD AUTO: 3.81 M/UL (ref 3.8–5.2)
SODIUM SERPL-SCNC: 137 MMOL/L (ref 136–145)
TROPONIN I SERPL-MCNC: <0.05 NG/ML
WBC # BLD AUTO: 9.8 K/UL (ref 3.6–11)

## 2021-04-06 PROCEDURE — 74011000636 HC RX REV CODE- 636: Performed by: FAMILY MEDICINE

## 2021-04-06 PROCEDURE — 71275 CT ANGIOGRAPHY CHEST: CPT

## 2021-04-06 PROCEDURE — 99284 EMERGENCY DEPT VISIT MOD MDM: CPT

## 2021-04-06 PROCEDURE — 80053 COMPREHEN METABOLIC PANEL: CPT

## 2021-04-06 PROCEDURE — 74011250637 HC RX REV CODE- 250/637: Performed by: FAMILY MEDICINE

## 2021-04-06 PROCEDURE — 84484 ASSAY OF TROPONIN QUANT: CPT

## 2021-04-06 PROCEDURE — 96374 THER/PROPH/DIAG INJ IV PUSH: CPT

## 2021-04-06 PROCEDURE — 85025 COMPLETE CBC W/AUTO DIFF WBC: CPT

## 2021-04-06 PROCEDURE — 74011250636 HC RX REV CODE- 250/636: Performed by: FAMILY MEDICINE

## 2021-04-06 PROCEDURE — 82962 GLUCOSE BLOOD TEST: CPT

## 2021-04-06 PROCEDURE — 96361 HYDRATE IV INFUSION ADD-ON: CPT

## 2021-04-06 PROCEDURE — 74011250637 HC RX REV CODE- 250/637: Performed by: EMERGENCY MEDICINE

## 2021-04-06 PROCEDURE — 96375 TX/PRO/DX INJ NEW DRUG ADDON: CPT

## 2021-04-06 PROCEDURE — 93005 ELECTROCARDIOGRAM TRACING: CPT

## 2021-04-06 PROCEDURE — 36415 COLL VENOUS BLD VENIPUNCTURE: CPT

## 2021-04-06 PROCEDURE — 71045 X-RAY EXAM CHEST 1 VIEW: CPT

## 2021-04-06 RX ORDER — MAGNESIUM SULFATE HEPTAHYDRATE 40 MG/ML
2 INJECTION, SOLUTION INTRAVENOUS ONCE
Status: DISCONTINUED | OUTPATIENT
Start: 2021-04-06 | End: 2021-04-06

## 2021-04-06 RX ORDER — POTASSIUM CHLORIDE 750 MG/1
40 TABLET, FILM COATED, EXTENDED RELEASE ORAL
Status: COMPLETED | OUTPATIENT
Start: 2021-04-06 | End: 2021-04-06

## 2021-04-06 RX ORDER — GABAPENTIN 100 MG/1
100 CAPSULE ORAL ONCE
Status: COMPLETED | OUTPATIENT
Start: 2021-04-06 | End: 2021-04-06

## 2021-04-06 RX ORDER — POTASSIUM CHLORIDE AND SODIUM CHLORIDE 900; 300 MG/100ML; MG/100ML
INJECTION, SOLUTION INTRAVENOUS ONCE
Status: COMPLETED | OUTPATIENT
Start: 2021-04-06 | End: 2021-04-06

## 2021-04-06 RX ORDER — MAGNESIUM SULFATE HEPTAHYDRATE 40 MG/ML
2 INJECTION, SOLUTION INTRAVENOUS ONCE
Status: COMPLETED | OUTPATIENT
Start: 2021-04-06 | End: 2021-04-06

## 2021-04-06 RX ADMIN — POTASSIUM CHLORIDE AND SODIUM CHLORIDE: 900; 300 INJECTION, SOLUTION INTRAVENOUS at 16:05

## 2021-04-06 RX ADMIN — GABAPENTIN 100 MG: 100 CAPSULE ORAL at 19:34

## 2021-04-06 RX ADMIN — POTASSIUM CHLORIDE 40 MEQ: 750 TABLET, EXTENDED RELEASE ORAL at 16:04

## 2021-04-06 RX ADMIN — MAGNESIUM SULFATE HEPTAHYDRATE 2 G: 40 INJECTION, SOLUTION INTRAVENOUS at 19:21

## 2021-04-06 RX ADMIN — SODIUM CHLORIDE 1000 ML: 9 INJECTION, SOLUTION INTRAVENOUS at 15:40

## 2021-04-06 RX ADMIN — IOPAMIDOL 100 ML: 755 INJECTION, SOLUTION INTRAVENOUS at 17:15

## 2021-04-06 NOTE — DISCHARGE INSTRUCTIONS
Thank you! Thank you for allowing me to care for you in the emergency department. I sincerely hope that you are satisfied with your visit today. It is my goal to provide you with excellent care. Below you will find a list of your labs and imaging from your visit today. Should you have any questions regarding these results please do not hesitate to call the emergency department. Labs -     Recent Results (from the past 12 hour(s))   GLUCOSE, POC    Collection Time: 04/06/21  2:56 PM   Result Value Ref Range    Glucose (POC) 142 (H) 65 - 100 mg/dL    Performed by [facility]    CBC WITH AUTOMATED DIFF    Collection Time: 04/06/21  3:15 PM   Result Value Ref Range    WBC 9.8 3.6 - 11.0 K/uL    RBC 3.81 3.80 - 5.20 M/uL    HGB 12.3 11.5 - 16.0 g/dL    HCT 34.5 (L) 35.0 - 47.0 %    MCV 90.6 80.0 - 99.0 FL    MCH 32.3 26.0 - 34.0 PG    MCHC 35.7 30.0 - 36.5 g/dL    RDW 12.9 11.5 - 14.5 %    PLATELET 983 685 - 516 K/uL    MPV 10.4 8.9 - 12.9 FL    NEUTROPHILS 77 (H) 32 - 75 %    LYMPHOCYTES 17 12 - 49 %    MONOCYTES 5 5 - 13 %    EOSINOPHILS 1 0 - 7 %    BASOPHILS 0 0 - 1 %    IMMATURE GRANULOCYTES 0 0.0 - 0.5 %    ABS. NEUTROPHILS 7.5 1.8 - 8.0 K/UL    ABS. LYMPHOCYTES 1.6 0.8 - 3.5 K/UL    ABS. MONOCYTES 0.5 0.0 - 1.0 K/UL    ABS. EOSINOPHILS 0.1 0.0 - 0.4 K/UL    ABS. BASOPHILS 0.0 0.0 - 0.1 K/UL    ABS. IMM.  GRANS. 0.0 0.00 - 0.04 K/UL    DF AUTOMATED     METABOLIC PANEL, COMPREHENSIVE    Collection Time: 04/06/21  3:15 PM   Result Value Ref Range    Sodium 137 136 - 145 mmol/L    Potassium 2.5 (LL) 3.5 - 5.1 mmol/L    Chloride 99 97 - 108 mmol/L    CO2 21 21 - 32 mmol/L    Anion gap 17 (H) 5 - 15 mmol/L    Glucose 138 (H) 65 - 100 mg/dL    BUN 18 6 - 20 mg/dL    Creatinine 1.00 0.55 - 1.02 mg/dL    BUN/Creatinine ratio 18 12 - 20      GFR est AA >60 >60 ml/min/1.73m2    GFR est non-AA 57 (L) >60 ml/min/1.73m2    Calcium 9.3 8.5 - 10.1 mg/dL    Bilirubin, total 1.0 0.2 - 1.0 mg/dL    AST (SGOT) 27 15 - 37 U/L    ALT (SGPT) 33 12 - 78 U/L    Alk. phosphatase 104 45 - 117 U/L    Protein, total 7.3 6.4 - 8.2 g/dL    Albumin 4.1 3.5 - 5.0 g/dL    Globulin 3.2 2.0 - 4.0 g/dL    A-G Ratio 1.3 1.1 - 2.2     TROPONIN I    Collection Time: 04/06/21  3:15 PM   Result Value Ref Range    Troponin-I, Qt. <0.05 <0.05 ng/mL       Radiologic Studies -   CTA CHEST W OR W WO CONT   Final Result   Cardiomegaly. No evidence of pulmonary embolus or other acute   cardiopulmonary disease. Moderate size retrocardiac hiatal hernia. Please see full report. XR CHEST PORT   Final Result   No acute cardiopulmonary disease identified. No change in prior neck   fusion surgery. Small retrocardiac hiatal hernia. CT Results  (Last 48 hours)                 04/06/21 1730  CTA CHEST W OR W WO CONT Final result    Impression:  Cardiomegaly. No evidence of pulmonary embolus or other acute   cardiopulmonary disease. Moderate size retrocardiac hiatal hernia. Please see full report. Narrative:  History is shortness of breath. Tachycardia. Comparison is with a chest x-ray of earlier today. TECHNIQUE: Serial axial images were obtained from the thoracic inlet through the   lung bases at 2.5 mm intervals during 100 cc Isovue-370 IV contrast   administration. Axial and coronal maximal intensity projection/MIP and sagittal   and coronal reformatted images are reviewed. Lung, soft tissue and bone windows   are evaluated. Dose reduction: All CT scans at this facility are performed using dose reduction   optimization techniques as appropriate to a performed exam including the   following: Automated exposure control, adjustments of the mA and/or kV according   to patient size, or use of iterative reconstruction technique. Findings: The enhancement of the pulmonary arteries is exuberant. There is no   evidence of pulmonary embolus in the visualized pulmonary arteries.   There is no   pneumonia, pneumothorax or effusion. No parenchymal mass is identified. There is a moderate retrocardiac hiatal hernia. The heart is enlarged. There is no pericardial effusion. There is no adenopathy. The aorta is not dilated and there is no dissection. Images into the upper abdomen demonstrate nonobstructing bilateral renal   calculi. There is no evidence of hydronephrosis bilaterally. No gallstones are   seen. The osseous structures are within normal limits. CXR Results  (Last 48 hours)                 04/06/21 1600  XR CHEST PORT Final result    Impression:  No acute cardiopulmonary disease identified. No change in prior neck   fusion surgery. Small retrocardiac hiatal hernia. Narrative:  History is shortness of breath. Comparison is with the chest x-ray of 4/20/2020. An AP portable upright view of the chest demonstrates no pneumonia, pneumothorax   or effusion. The heart is not enlarged. The osseous structures are within normal   limits, except for a cervical plate and screws fusing the lower cervical spine. There is a small retrocardiac hiatal hernia. If you feel that you have not received excellent quality care or timely care, please ask to speak to the nurse manager. Please choose us in the future for your continued health care needs. ------------------------------------------------------------------------------------------------------------  The exam and treatment you received in the Emergency Department were for an urgent problem and are not intended as complete care. It is important that you follow-up with a doctor, nurse practitioner, or physician assistant to:  (1) confirm your diagnosis,  (2) re-evaluation of changes in your illness and treatment, and  (3) for ongoing care. If your symptoms become worse or you do not improve as expected and you are unable to reach your usual health care provider, you should return to the Emergency Department. We are available 24 hours a day. Please take your discharge instructions with you when you go to your follow-up appointment. If you have any problem arranging a follow-up appointment, contact the Emergency Department immediately. If a prescription has been provided, please have it filled as soon as possible to prevent a delay in treatment. Read the entire medication instruction sheet provided to you by the pharmacy. If you have any questions or reservations about taking the medication due to side effects or interactions with other medications, please call your primary care physician or contact the ER to speak with the charge nurse. Make an appointment with your family doctor or the physician you were referred to for follow-up of this visit as instructed on your discharge paperwork, as this is a mandatory follow-up. Return to the ER if you are unable to be seen or if you are unable to be seen in a timely manner. If you have any problem arranging the follow-up visit, contact the Emergency Department immediately.

## 2021-04-06 NOTE — ED TRIAGE NOTES
\" I got up and got in the shower and a vein popped in my right leg and I lost a lot of blood, then I went to work and started feeling really weak and short of breath \"

## 2021-04-06 NOTE — ED PROVIDER NOTES
EMERGENCY DEPARTMENT HISTORY AND PHYSICAL EXAM      Date: 4/6/2021  Patient Name: Alysha Flower    History of Presenting Illness     Chief Complaint   Patient presents with    Shortness of Breath    Varicose Veins    Fatigue       History Provided By:     HPI: Alysha Flower, is an extremely pleasant 62 y.o. female presenting to the ED with a chief complaint of shortness of breath and fatigue. She states this morning she had a small varicose vein that ruptured. She states she got the bleeding under control shortly after. She went to work and developed shortness of breath. She has never experienced this before. She denies any chest pains. She denies pleuritic chest pains. She denies any swelling of her calf. She has not been coughing. She denies any fevers, chills or rigors. No nausea, vomiting or diarrhea. She states she feels anxious but denies any other concerns at this time. There are no other complaints, changes, or physical findings at this time. PCP: Samantha Rutherford MD    No current facility-administered medications on file prior to encounter. Current Outpatient Medications on File Prior to Encounter   Medication Sig Dispense Refill    escitalopram oxalate (LEXAPRO) 20 mg tablet Take 20 mg by mouth daily.  gabapentin (NEURONTIN) 300 mg capsule Take 300 mg by mouth. 2 caps per day      hydrOXYzine HCl (ATARAX) 25 mg tablet 25 mg three (3) times daily. 3    indomethacin (INDOCIN) 50 mg capsule 1 po bid with food 60 Cap 3    cholecalciferol, vitamin d3, (VITAMIN D) 1,000 unit tablet Take 1,000 Units by mouth daily.  CALCIUM CARBONATE/VITAMIN D3 (CALCIUM 600 + D PO) Take 2 Tabs by mouth daily.  citalopram (CELEXA) 10 mg tablet Take 10 mg by mouth daily.  levothyroxine (SYNTHROID) 75 mcg tablet Take 75 mcg by mouth daily (before breakfast).  dicyclomine (BENTYL) 20 mg tablet Take 20 mg by mouth as needed.       lisinopril (PRINIVIL, ZESTRIL) 20 mg tablet Take 20 mg by mouth daily. Past History     Past Medical History:  Past Medical History:   Diagnosis Date    Anxiety     Anxiety     Arthritis     Depression     GERD (gastroesophageal reflux disease)     Headache     Hypertension     IBS (irritable bowel syndrome)     Memory loss     Muscle pain     MVP (mitral valve prolapse)     Nausea & vomiting     Other ill-defined conditions(799.89)     alessio valve prolapse    Overweight(278.02)     RLS (restless legs syndrome)     Skipped beats     Thromboembolus (HCC)     Thrombophlebitis    Thyroid disease     Hypothyroid    TMJ (dislocation of temporomandibular joint)     TMJ syndrome     Unspecified sleep apnea     uses cpap    Urinary incontinence, stress        Past Surgical History:  Past Surgical History:   Procedure Laterality Date    HX GI      Rectal Fissure Repair    HX GYN      hysterectomy & bladder sling    HX HEENT      TMJ Procedure    HX ORTHOPAEDIC      Left bunionectomy       Family History:  Family History   Problem Relation Age of Onset    Other Mother         MVP    Hypertension Mother     Emphysema Father     Coronary Artery Disease Father     Kidney Disease Father     Other Father         PAD    Hypertension Father     Heart Disease Father     Diabetes Sister     Other Sister         1 sister  w/ DM, 2nd sister  w/ lupus complications    Asthma Brother        Social History:  Social History     Tobacco Use    Smoking status: Former Smoker     Packs/day: 0.25     Years: 10.00     Pack years: 2.50     Quit date: 1995     Years since quittin.2    Smokeless tobacco: Never Used   Substance Use Topics    Alcohol use: No     Comment: One mixed drink 4 times a year    Drug use: Yes     Types: Prescription, OTC       Allergies:   Allergies   Allergen Reactions    Amlodipine Swelling    Bactrim [Sulfamethoprim] Unknown (comments)    Lisinopril Swelling    Macrobid [Nitrofurantoin Monohyd/M-Cryst] Swelling    Sulfa (Sulfonamide Antibiotics) Rash    Topiramate Swelling         Review of Systems     Review of Systems   Constitutional: Positive for fatigue. Negative for activity change, appetite change, chills and fever. HENT: Negative for congestion and sore throat. Eyes: Negative for photophobia and visual disturbance. Respiratory: Positive for shortness of breath. Negative for cough and wheezing. Cardiovascular: Negative for chest pain, palpitations and leg swelling. Gastrointestinal: Negative for abdominal pain, diarrhea, nausea and vomiting. Endocrine: Negative for cold intolerance and heat intolerance. Musculoskeletal: Negative for gait problem and joint swelling. Skin: Negative for color change and rash. Broken varicose vein. Neurological: Negative for dizziness and headaches. Physical Exam     Physical Exam  Constitutional:       Appearance: She is well-developed. HENT:      Head: Normocephalic and atraumatic. Mouth/Throat:      Mouth: Mucous membranes are moist.      Pharynx: Oropharynx is clear. Eyes:      Conjunctiva/sclera: Conjunctivae normal.      Pupils: Pupils are equal, round, and reactive to light. Neck:      Musculoskeletal: Normal range of motion and neck supple. Cardiovascular:      Rate and Rhythm: Normal rate and regular rhythm. Heart sounds: No murmur. Pulmonary:      Effort: Tachypnea present. No accessory muscle usage or respiratory distress. Breath sounds: No stridor. No wheezing, rhonchi or rales. Abdominal:      General: There is no distension. Tenderness: There is no abdominal tenderness. There is no rebound. Skin:     General: Skin is warm and dry. Comments: Very small area of bruising where her varicose vein broke. No current bleeding. Neurological:      General: No focal deficit present. Mental Status: She is alert and oriented to person, place, and time.    Psychiatric:         Mood and Affect: Mood normal.         Behavior: Behavior normal.         Lab and Diagnostic Study Results     Labs -     Recent Results (from the past 12 hour(s))   GLUCOSE, POC    Collection Time: 04/06/21  2:56 PM   Result Value Ref Range    Glucose (POC) 142 (H) 65 - 100 mg/dL    Performed by Wyatt Patiño    CBC WITH AUTOMATED DIFF    Collection Time: 04/06/21  3:15 PM   Result Value Ref Range    WBC 9.8 3.6 - 11.0 K/uL    RBC 3.81 3.80 - 5.20 M/uL    HGB 12.3 11.5 - 16.0 g/dL    HCT 34.5 (L) 35.0 - 47.0 %    MCV 90.6 80.0 - 99.0 FL    MCH 32.3 26.0 - 34.0 PG    MCHC 35.7 30.0 - 36.5 g/dL    RDW 12.9 11.5 - 14.5 %    PLATELET 094 984 - 655 K/uL    MPV 10.4 8.9 - 12.9 FL    NEUTROPHILS 77 (H) 32 - 75 %    LYMPHOCYTES 17 12 - 49 %    MONOCYTES 5 5 - 13 %    EOSINOPHILS 1 0 - 7 %    BASOPHILS 0 0 - 1 %    IMMATURE GRANULOCYTES 0 0.0 - 0.5 %    ABS. NEUTROPHILS 7.5 1.8 - 8.0 K/UL    ABS. LYMPHOCYTES 1.6 0.8 - 3.5 K/UL    ABS. MONOCYTES 0.5 0.0 - 1.0 K/UL    ABS. EOSINOPHILS 0.1 0.0 - 0.4 K/UL    ABS. BASOPHILS 0.0 0.0 - 0.1 K/UL    ABS. IMM. GRANS. 0.0 0.00 - 0.04 K/UL    DF AUTOMATED     METABOLIC PANEL, COMPREHENSIVE    Collection Time: 04/06/21  3:15 PM   Result Value Ref Range    Sodium 137 136 - 145 mmol/L    Potassium 2.5 (LL) 3.5 - 5.1 mmol/L    Chloride 99 97 - 108 mmol/L    CO2 21 21 - 32 mmol/L    Anion gap 17 (H) 5 - 15 mmol/L    Glucose 138 (H) 65 - 100 mg/dL    BUN 18 6 - 20 mg/dL    Creatinine 1.00 0.55 - 1.02 mg/dL    BUN/Creatinine ratio 18 12 - 20      GFR est AA >60 >60 ml/min/1.73m2    GFR est non-AA 57 (L) >60 ml/min/1.73m2    Calcium 9.3 8.5 - 10.1 mg/dL    Bilirubin, total 1.0 0.2 - 1.0 mg/dL    AST (SGOT) 27 15 - 37 U/L    ALT (SGPT) 33 12 - 78 U/L    Alk.  phosphatase 104 45 - 117 U/L    Protein, total 7.3 6.4 - 8.2 g/dL    Albumin 4.1 3.5 - 5.0 g/dL    Globulin 3.2 2.0 - 4.0 g/dL    A-G Ratio 1.3 1.1 - 2.2     TROPONIN I    Collection Time: 04/06/21  3:15 PM   Result Value Ref Range    Troponin-I, Qt. <0.05 <0.05 ng/mL       Radiologic Studies -   @lastxrresult@  CT Results  (Last 48 hours)               04/06/21 1730  CTA CHEST W OR W WO CONT Final result    Impression:  Cardiomegaly. No evidence of pulmonary embolus or other acute   cardiopulmonary disease. Moderate size retrocardiac hiatal hernia. Please see full report. Narrative:  History is shortness of breath. Tachycardia. Comparison is with a chest x-ray of earlier today. TECHNIQUE: Serial axial images were obtained from the thoracic inlet through the   lung bases at 2.5 mm intervals during 100 cc Isovue-370 IV contrast   administration. Axial and coronal maximal intensity projection/MIP and sagittal   and coronal reformatted images are reviewed. Lung, soft tissue and bone windows   are evaluated. Dose reduction: All CT scans at this facility are performed using dose reduction   optimization techniques as appropriate to a performed exam including the   following: Automated exposure control, adjustments of the mA and/or kV according   to patient size, or use of iterative reconstruction technique. Findings: The enhancement of the pulmonary arteries is exuberant. There is no   evidence of pulmonary embolus in the visualized pulmonary arteries. There is no   pneumonia, pneumothorax or effusion. No parenchymal mass is identified. There is a moderate retrocardiac hiatal hernia. The heart is enlarged. There is no pericardial effusion. There is no adenopathy. The aorta is not dilated and there is no dissection. Images into the upper abdomen demonstrate nonobstructing bilateral renal   calculi. There is no evidence of hydronephrosis bilaterally. No gallstones are   seen. The osseous structures are within normal limits. CXR Results  (Last 48 hours)               04/06/21 1600  XR CHEST PORT Final result    Impression:  No acute cardiopulmonary disease identified.  No change in prior neck   fusion surgery. Small retrocardiac hiatal hernia. Narrative:  History is shortness of breath. Comparison is with the chest x-ray of 4/20/2020. An AP portable upright view of the chest demonstrates no pneumonia, pneumothorax   or effusion. The heart is not enlarged. The osseous structures are within normal   limits, except for a cervical plate and screws fusing the lower cervical spine. There is a small retrocardiac hiatal hernia. Medical Decision Making   - I am the first provider for this patient. - I reviewed the vital signs, available nursing notes, past medical history, past surgical history, family history and social history. - Initial assessment performed. The patients presenting problems have been discussed, and they are in agreement with the care plan formulated and outlined with them. I have encouraged them to ask questions as they arise throughout their visit. Vital Signs-Reviewed the patient's vital signs. Patient Vitals for the past 12 hrs:   Temp Pulse Resp BP SpO2   04/06/21 1725  89 22 (!) 142/92 100 %   04/06/21 1504 98.4 °F (36.9 °C) 100 18 (!) 136/98 99 %         ED Course/ Provider Notes (Medical Decision Making):     Patient presented to the emergency department with a chief complaint of fatigue and shortness of breath. EKG shows sinus tachycardia with a heart rate of 100. No STEMI. Troponin is less than 0.05. Chest x-ray demonstrates no acute intrathoracic process other than small hiatal hernia. .  CBC unremarkable, BMP notable for hypokalemia of 2.5. She was subsequently given 40 meq of potassium chloride orally given as well as IV potassium replacement. Her tachycardia resolved with fluids. CT PE protocol was ordered and showed Cardiomegaly. No evidence of pulmonary embolus or other acute cardiopulmonary disease. Moderate size retrocardiac hiatal hernia. At the time of this dictation, her intravenous potassium chloride is completing infusion.   The case was discussed in detail with Dr. Savi Harmon who be assuming care of this patient until her infusion is completed and she is discharged. Mariam Silva was given a thorough list of signs and symptoms that would warrant an immediate return to the emergency department. Otherwise Mariam Silva will follow up with PCP. Procedures   Medical Decision Makingedical Decision Making  Performed by: Marissa Landon DO  Procedures  None       Disposition   Disposition:     Home     All of the diagnostic tests were reviewed and questions answered. Diagnosis, care plan and treatment options were discussed. The patient understands the instructions and will follow up as directed. The patients results have been reviewed with them. They have been counseled regarding their diagnosis. The patient verbally convey understanding and agreement of the signs, symptoms, diagnosis, treatment and prognosis and additionally agrees to follow up as recommended with their PCP in 24 - 48 hours. They also agree with the care-plan and convey that all of their questions have been answered. I have also put together some discharge instructions for them that include: 1) educational information regarding their diagnosis, 2) how to care for their diagnosis at home, as well a 3) list of reasons why they would want to return to the ED prior to their follow-up appointment, should their condition change. DISCHARGE PLAN:    1. Current Discharge Medication List      CONTINUE these medications which have NOT CHANGED    Details   escitalopram oxalate (LEXAPRO) 20 mg tablet Take 20 mg by mouth daily. gabapentin (NEURONTIN) 300 mg capsule Take 300 mg by mouth. 2 caps per day      hydrOXYzine HCl (ATARAX) 25 mg tablet 25 mg three (3) times daily.   Refills: 3      indomethacin (INDOCIN) 50 mg capsule 1 po bid with food  Qty: 60 Cap, Refills: 3      cholecalciferol, vitamin d3, (VITAMIN D) 1,000 unit tablet Take 1,000 Units by mouth daily.      CALCIUM CARBONATE/VITAMIN D3 (CALCIUM 600 + D PO) Take 2 Tabs by mouth daily. citalopram (CELEXA) 10 mg tablet Take 10 mg by mouth daily. levothyroxine (SYNTHROID) 75 mcg tablet Take 75 mcg by mouth daily (before breakfast). dicyclomine (BENTYL) 20 mg tablet Take 20 mg by mouth as needed. lisinopril (PRINIVIL, ZESTRIL) 20 mg tablet Take 20 mg by mouth daily. 2.   Follow-up Information    None           3. Return to ED if worse       4. Current Discharge Medication List            Diagnosis     Clinical Impression:    1. Hypokalemia    2. Hiatal hernia        Attestations:    En Duran, DO    Please note that this dictation was completed with [employer], the computer voice recognition software. Quite often unanticipated grammatical, syntax, homophones, and other interpretive errors are inadvertently transcribed by the computer software. Please disregard these errors. Please excuse any errors that have escaped final proofreading. Thank you.

## 2021-04-07 NOTE — ED NOTES
Pt noticeably uncomfortable and restless, states she has hx RLS and is past time to take her home dose of gabapentin. Discussed with Dr. Josemanuel Tenorio, verbal orders received to give gabapentin 100mg x1dose.

## 2022-03-18 PROBLEM — G44.84 EXERTIONAL HEADACHE: Status: ACTIVE | Noted: 2017-08-15

## 2023-05-23 RX ORDER — DICYCLOMINE HCL 20 MG
20 TABLET ORAL PRN
COMMUNITY

## 2023-05-23 RX ORDER — INDOMETHACIN 50 MG/1
CAPSULE ORAL
COMMUNITY
Start: 2017-08-15

## 2023-05-23 RX ORDER — HYDROXYZINE HYDROCHLORIDE 25 MG/1
25 TABLET, FILM COATED ORAL 3 TIMES DAILY
COMMUNITY
Start: 2017-07-20

## 2023-05-23 RX ORDER — ESCITALOPRAM OXALATE 20 MG/1
20 TABLET ORAL DAILY
COMMUNITY

## 2023-05-23 RX ORDER — LISINOPRIL 20 MG/1
20 TABLET ORAL DAILY
COMMUNITY

## 2023-05-23 RX ORDER — CITALOPRAM 10 MG/1
10 TABLET ORAL DAILY
COMMUNITY

## 2023-05-23 RX ORDER — GABAPENTIN 300 MG/1
300 CAPSULE ORAL
COMMUNITY

## 2023-05-23 RX ORDER — LEVOTHYROXINE SODIUM 0.07 MG/1
75 TABLET ORAL
COMMUNITY

## 2024-09-11 ENCOUNTER — TELEPHONE (OUTPATIENT)
Age: 64
End: 2024-09-11

## 2024-12-18 ENCOUNTER — PROCEDURE VISIT (OUTPATIENT)
Age: 64
End: 2024-12-18

## 2024-12-18 ENCOUNTER — OFFICE VISIT (OUTPATIENT)
Age: 64
End: 2024-12-18
Payer: COMMERCIAL

## 2024-12-18 VITALS
BODY MASS INDEX: 18.68 KG/M2 | HEART RATE: 87 BPM | OXYGEN SATURATION: 97 % | HEIGHT: 67 IN | WEIGHT: 119 LBS | SYSTOLIC BLOOD PRESSURE: 118 MMHG | DIASTOLIC BLOOD PRESSURE: 78 MMHG | RESPIRATION RATE: 18 BRPM

## 2024-12-18 DIAGNOSIS — R26.89 IMPAIRMENT OF BALANCE: ICD-10-CM

## 2024-12-18 DIAGNOSIS — R42 DIZZINESS AND GIDDINESS: Primary | ICD-10-CM

## 2024-12-18 DIAGNOSIS — G43.109 OCULAR MIGRAINE: ICD-10-CM

## 2024-12-18 DIAGNOSIS — H93.19 TINNITUS, UNSPECIFIED LATERALITY: ICD-10-CM

## 2024-12-18 DIAGNOSIS — R42 DIZZINESS: Primary | ICD-10-CM

## 2024-12-18 PROCEDURE — 99204 OFFICE O/P NEW MOD 45 MIN: CPT | Performed by: OTOLARYNGOLOGY

## 2024-12-18 ASSESSMENT — ENCOUNTER SYMPTOMS
EYE DISCHARGE: 0
SORE THROAT: 0
CHOKING: 0
VOMITING: 0
BACK PAIN: 0
ABDOMINAL PAIN: 0
SINUS PAIN: 0
STRIDOR: 0
NAUSEA: 0
SHORTNESS OF BREATH: 0
VOICE CHANGE: 0
WHEEZING: 0
SINUS PRESSURE: 0
EYE ITCHING: 0
PHOTOPHOBIA: 0
COUGH: 0
APNEA: 0
TROUBLE SWALLOWING: 0

## 2024-12-18 NOTE — PATIENT INSTRUCTIONS
out of walking paths.  Use nonskid floor wax, and wipe up spills right away, especially on ceramic tile floors.  If you use a walker or cane, put rubber tips on it. If you use crutches, clean the bottoms of them regularly with an abrasive pad, such as steel wool.  Keep your house well lit, especially stairways, porches, and outside walkways. Use night-lights in areas such as hallways and bathrooms. Add extra light switches or use remote switches (such as switches that go on or off when you clap your hands) to make it easier to turn lights on if you have to get up during the night.  Install sturdy handrails on stairways.  Move items in your cabinets so that the things you use a lot are on the lower shelves (about waist level).  Keep a cordless phone and a flashlight with new batteries by your bed. If possible, put a phone in each of the main rooms of your house, or carry a cell phone in case you fall and cannot reach a phone. Or, you can wear a device around your neck or wrist. You push a button that sends a signal for help.  Wear low-heeled shoes that fit well and give your feet good support. Use footwear with nonskid soles. Check the heels and soles of your shoes for wear. Repair or replace worn heels or soles.  Do not wear socks without shoes on wood or other flat, smooth floors.  Walk on the grass when the sidewalks are slippery. If you live in an area that gets snow and ice in the winter, sprinkle salt on slippery steps and sidewalks.    Preventing falls in the bath  Install grab bars and nonskid mats inside and outside your shower or tub and near the toilet and sinks.  Use shower chairs and bath benches.  Use a hand-held shower head that will allow you to sit while showering.  Get into a tub or shower by putting the weaker leg in first. Get out of a tub or shower with your strong side first.  Repair loose toilet seats and consider installing a raised toilet seat to make getting on and off the toilet

## 2024-12-18 NOTE — PROGRESS NOTES
Sanjuanita Ennis   1960, 64 y.o. female   [id number]       Referring Provider: Melvin Snider MD  Referral Type: In an order in Epic    Reason for Visit: Evaluation of the cause of disorders of hearing, tinnitus, or balance.    ADULT AUDIOLOGIC EVALUATION      Sanjuanita Ennis is a 64 y.o. female seen today, 12/18/2024 , for an initial audiologic evaluation.  Patient was seen by Melvin Snider MD following today's evaluation.    AUDIOLOGIC AND OTHER PERTINENT MEDICAL HISTORY:      Sanjuanita Ennis reports dizziness, described as a spinning-sensation, on-and-off for the past 6 months. Duration of episodes is approximately one minute. Frequency of episodes is weekly-monthly.Initial onset reportedly had no trigger. She reports intermittent, non-bothersome tinnitus of unknown laterality.     She denied otalgia, aural fullness, otorrhea, and history of noise exposure    Date: 12/18/2024     IMPRESSIONS:      Today's results revealed Normal hearing 250-8000 Hz bilaterally. Excellent speech understanding when in quiet. Tympanometry indicates normal middle ear function. Discussed test results and implications with patient. Discussed use of tinnitus management strategies. Discussed noise exposure and the importance of appropriate hearing protection when in hazardous noise environments.    Follow medical recommendations of Melvin Snider MD.     ASSESSMENT AND FINDINGS:     Otoscopy unremarkable.    RIGHT EAR:  Hearing Sensitivity: Normal hearing sensitivity 250-8000 Hz  Speech Recognition Threshold: 10 dB HL  Word Recognition: Excellent 100%, based on NU-6 by-difficulty list at 50 dBHL using recorded speech stimuli.    Tympanometry: Normal peak pressure and compliance, Type A tympanogram, consistent with normal middle ear function.       LEFT EAR:  Hearing Sensitivity: Normal hearing sensitivity 250-8000 Hz  Speech Recognition Threshold: 15 dB HL  Word Recognition: Excellent 100%, based on NU-6 by-difficulty list at 50 dBHL using

## 2024-12-18 NOTE — PROGRESS NOTES
GERD (gastroesophageal reflux disease)     Headache     Hypertension     IBS (irritable bowel syndrome)     Memory loss     Muscle pain     MVP (mitral valve prolapse)     Nausea & vomiting     Other ill-defined conditions(799.89)     ramon valve prolapse    Overweight(278.02)     RLS (restless legs syndrome)     Skipped beats     Thromboembolus (HCC)     Thrombophlebitis    Thyroid disease     Hypothyroid    TMJ (dislocation of temporomandibular joint)     TMJ syndrome     Unspecified sleep apnea     uses cpap     Past Surgical History:   Procedure Laterality Date    GI      Rectal Fissure Repair    GYN      hysterectomy & bladder sling    HEENT      TMJ Procedure    ORTHOPEDIC SURGERY      Left bunionectomy      Family History   Problem Relation Age of Onset    Other Sister         1 sister  w/ DM, 2nd sister  w/ lupus complications    Asthma Brother     Diabetes Sister     Heart Disease Father     Hypertension Father     Other Father         PAD    Kidney Disease Father     Coronary Art Dis Father     Emphysema Father     Hypertension Mother     Other Mother         MVP     Social History     Tobacco Use    Smoking status: Former     Current packs/day: 0.00     Types: Cigarettes     Quit date: 1995     Years since quittin.9    Smokeless tobacco: Never   Substance Use Topics    Alcohol use: No      Prior to Admission medications    Medication Sig Start Date End Date Taking? Authorizing Provider   vitamin D (CHOLECALCIFEROL) 25 MCG (1000 UT) TABS tablet Take 1 tablet by mouth daily    Automatic Reconciliation, Ar   citalopram (CELEXA) 10 MG tablet Take 1 tablet by mouth daily    Automatic Reconciliation, Ar   dicyclomine (BENTYL) 20 MG tablet Take 1 tablet by mouth as needed    Automatic Reconciliation, Ar   escitalopram (LEXAPRO) 20 MG tablet Take 1 tablet by mouth daily    Automatic Reconciliation, Ar   gabapentin (NEURONTIN) 300 MG capsule Take 1 capsule by mouth.    Automatic Reconciliation, Ar

## 2025-02-05 ENCOUNTER — TELEPHONE (OUTPATIENT)
Age: 65
End: 2025-02-05

## 2025-02-05 DIAGNOSIS — R42 DIZZINESS: Primary | ICD-10-CM

## 2025-02-05 RX ORDER — DIAZEPAM 5 MG/1
TABLET ORAL
Qty: 5 TABLET | Refills: 0 | Status: SHIPPED | OUTPATIENT
Start: 2025-02-07 | End: 2025-02-12

## 2025-02-07 DIAGNOSIS — R26.89 IMPAIRMENT OF BALANCE: ICD-10-CM

## 2025-02-07 DIAGNOSIS — G43.109 OCULAR MIGRAINE: ICD-10-CM

## 2025-02-07 DIAGNOSIS — R42 DIZZINESS: Primary | ICD-10-CM

## 2025-02-10 ENCOUNTER — HOSPITAL ENCOUNTER (OUTPATIENT)
Facility: HOSPITAL | Age: 65
Discharge: HOME | End: 2025-02-13
Attending: OTOLARYNGOLOGY
Payer: COMMERCIAL

## 2025-02-10 DIAGNOSIS — R42 DIZZINESS: ICD-10-CM

## 2025-02-10 DIAGNOSIS — G43.109 OCULAR MIGRAINE: ICD-10-CM

## 2025-02-10 DIAGNOSIS — R26.89 IMPAIRMENT OF BALANCE: ICD-10-CM

## 2025-02-10 PROCEDURE — A9577 INJ MULTIHANCE: HCPCS | Performed by: OTOLARYNGOLOGY

## 2025-02-10 PROCEDURE — 70553 MRI BRAIN STEM W/O & W/DYE: CPT

## 2025-02-10 PROCEDURE — 6360000004 HC RX CONTRAST MEDICATION: Performed by: OTOLARYNGOLOGY

## 2025-02-10 RX ADMIN — GADOBENATE DIMEGLUMINE 19 ML: 529 INJECTION, SOLUTION INTRAVENOUS at 10:41

## 2025-02-11 DIAGNOSIS — R26.89 IMPAIRMENT OF BALANCE: Primary | ICD-10-CM

## 2025-02-11 NOTE — RESULT ENCOUNTER NOTE
Pls notify pt her MRI was normal.  I am recommending vestibular physical therapy to help with her balance.  Will refer to Alex Bojorquez PT